# Patient Record
Sex: MALE | Race: BLACK OR AFRICAN AMERICAN | NOT HISPANIC OR LATINO | URBAN - METROPOLITAN AREA
[De-identification: names, ages, dates, MRNs, and addresses within clinical notes are randomized per-mention and may not be internally consistent; named-entity substitution may affect disease eponyms.]

---

## 2019-06-13 ENCOUNTER — EMERGENCY (EMERGENCY)
Facility: HOSPITAL | Age: 49
LOS: 1 days | Discharge: HOME | End: 2019-06-13
Attending: EMERGENCY MEDICINE | Admitting: EMERGENCY MEDICINE
Payer: COMMERCIAL

## 2019-06-13 VITALS
OXYGEN SATURATION: 100 % | DIASTOLIC BLOOD PRESSURE: 91 MMHG | SYSTOLIC BLOOD PRESSURE: 147 MMHG | TEMPERATURE: 99 F | HEART RATE: 75 BPM | RESPIRATION RATE: 16 BRPM

## 2019-06-13 LAB
ALBUMIN SERPL ELPH-MCNC: 4.4 G/DL — SIGNIFICANT CHANGE UP (ref 3.3–5)
ALP SERPL-CCNC: 57 U/L — SIGNIFICANT CHANGE UP (ref 40–120)
ALT FLD-CCNC: 29 U/L — SIGNIFICANT CHANGE UP (ref 4–41)
ANION GAP SERPL CALC-SCNC: 12 MMO/L — SIGNIFICANT CHANGE UP (ref 7–14)
AST SERPL-CCNC: 27 U/L — SIGNIFICANT CHANGE UP (ref 4–40)
BASOPHILS # BLD AUTO: 0.06 K/UL — SIGNIFICANT CHANGE UP (ref 0–0.2)
BASOPHILS NFR BLD AUTO: 0.7 % — SIGNIFICANT CHANGE UP (ref 0–2)
BILIRUB SERPL-MCNC: 1.5 MG/DL — HIGH (ref 0.2–1.2)
BUN SERPL-MCNC: 7 MG/DL — SIGNIFICANT CHANGE UP (ref 7–23)
CALCIUM SERPL-MCNC: 9.2 MG/DL — SIGNIFICANT CHANGE UP (ref 8.4–10.5)
CHLORIDE SERPL-SCNC: 101 MMOL/L — SIGNIFICANT CHANGE UP (ref 98–107)
CO2 SERPL-SCNC: 28 MMOL/L — SIGNIFICANT CHANGE UP (ref 22–31)
CREAT SERPL-MCNC: 0.87 MG/DL — SIGNIFICANT CHANGE UP (ref 0.5–1.3)
EOSINOPHIL # BLD AUTO: 0.29 K/UL — SIGNIFICANT CHANGE UP (ref 0–0.5)
EOSINOPHIL NFR BLD AUTO: 3.4 % — SIGNIFICANT CHANGE UP (ref 0–6)
GLUCOSE SERPL-MCNC: 93 MG/DL — SIGNIFICANT CHANGE UP (ref 70–99)
HCT VFR BLD CALC: 38.9 % — LOW (ref 39–50)
HGB BLD-MCNC: 13.8 G/DL — SIGNIFICANT CHANGE UP (ref 13–17)
IMM GRANULOCYTES NFR BLD AUTO: 0.2 % — SIGNIFICANT CHANGE UP (ref 0–1.5)
LIDOCAIN IGE QN: 16.4 U/L — SIGNIFICANT CHANGE UP (ref 7–60)
LYMPHOCYTES # BLD AUTO: 3.94 K/UL — HIGH (ref 1–3.3)
LYMPHOCYTES # BLD AUTO: 45.5 % — HIGH (ref 13–44)
MCHC RBC-ENTMCNC: 27.9 PG — SIGNIFICANT CHANGE UP (ref 27–34)
MCHC RBC-ENTMCNC: 35.5 % — SIGNIFICANT CHANGE UP (ref 32–36)
MCV RBC AUTO: 78.6 FL — LOW (ref 80–100)
MONOCYTES # BLD AUTO: 0.79 K/UL — SIGNIFICANT CHANGE UP (ref 0–0.9)
MONOCYTES NFR BLD AUTO: 9.1 % — SIGNIFICANT CHANGE UP (ref 2–14)
NEUTROPHILS # BLD AUTO: 3.55 K/UL — SIGNIFICANT CHANGE UP (ref 1.8–7.4)
NEUTROPHILS NFR BLD AUTO: 41.1 % — LOW (ref 43–77)
NRBC # FLD: 0.12 K/UL — SIGNIFICANT CHANGE UP (ref 0–0)
NRBC FLD-RTO: 1.4 — SIGNIFICANT CHANGE UP
PLATELET # BLD AUTO: 207 K/UL — SIGNIFICANT CHANGE UP (ref 150–400)
PMV BLD: 10 FL — SIGNIFICANT CHANGE UP (ref 7–13)
POTASSIUM SERPL-MCNC: 4.2 MMOL/L — SIGNIFICANT CHANGE UP (ref 3.5–5.3)
POTASSIUM SERPL-SCNC: 4.2 MMOL/L — SIGNIFICANT CHANGE UP (ref 3.5–5.3)
PROT SERPL-MCNC: 7.1 G/DL — SIGNIFICANT CHANGE UP (ref 6–8.3)
RBC # BLD: 4.95 M/UL — SIGNIFICANT CHANGE UP (ref 4.2–5.8)
RBC # FLD: 15.4 % — HIGH (ref 10.3–14.5)
SODIUM SERPL-SCNC: 141 MMOL/L — SIGNIFICANT CHANGE UP (ref 135–145)
WBC # BLD: 8.65 K/UL — SIGNIFICANT CHANGE UP (ref 3.8–10.5)
WBC # FLD AUTO: 8.65 K/UL — SIGNIFICANT CHANGE UP (ref 3.8–10.5)

## 2019-06-13 PROCEDURE — 70450 CT HEAD/BRAIN W/O DYE: CPT | Mod: 26

## 2019-06-13 PROCEDURE — 99284 EMERGENCY DEPT VISIT MOD MDM: CPT

## 2019-06-13 NOTE — ED PROVIDER NOTE - HISTORY ATTESTATION, MLM
October 20, 2017     [address] [place] [address]      Dear Christina Clark:    Below are the results from your recent visit:    Resulted Orders   COMP METABOLIC PANEL (14)   Result Value Ref Range    Glucose 102 (H) 70 - 99 mg/dL MD Blood Smear Consult         Evaluation of CBC with differential data and the associated peripheral blood smear demonstrates erythrocytosis, mild neutrophilia, and mild lymphocytopenia.     Neutrophils consist predominately of mature and scattered band f The white blood cell count as well as neutrophils are slightly elevated, which can indicate the presence of infection. This is most likely related to your recent gastrointestinal upset. No further testing is needed as long as your symptoms resolve.      The I have reviewed and confirmed nurses' notes...

## 2019-06-13 NOTE — ED PROVIDER NOTE - OBJECTIVE STATEMENT
50 y/o male with no PMHx presents to the ED c/o left sided headache, neck pain radiating to his head and ear. Also reports uncomfortable swallowing, mouth dryness, and abdominal pain. Denies stress, trauma, fevers/ chills, n/v/d. No other acute complaints at time of eval.

## 2019-07-29 ENCOUNTER — EMERGENCY (EMERGENCY)
Facility: HOSPITAL | Age: 49
LOS: 1 days | Discharge: HOME | End: 2019-07-29
Attending: EMERGENCY MEDICINE | Admitting: EMERGENCY MEDICINE
Payer: COMMERCIAL

## 2019-07-29 VITALS
TEMPERATURE: 99 F | RESPIRATION RATE: 16 BRPM | HEART RATE: 100 BPM | DIASTOLIC BLOOD PRESSURE: 85 MMHG | OXYGEN SATURATION: 98 % | SYSTOLIC BLOOD PRESSURE: 142 MMHG

## 2019-07-29 VITALS
RESPIRATION RATE: 18 BRPM | DIASTOLIC BLOOD PRESSURE: 86 MMHG | SYSTOLIC BLOOD PRESSURE: 143 MMHG | TEMPERATURE: 99 F | HEART RATE: 89 BPM | OXYGEN SATURATION: 98 %

## 2019-07-29 LAB
ALBUMIN SERPL ELPH-MCNC: 4.4 G/DL — SIGNIFICANT CHANGE UP (ref 3.3–5)
ALP SERPL-CCNC: 63 U/L — SIGNIFICANT CHANGE UP (ref 40–120)
ALT FLD-CCNC: 19 U/L — SIGNIFICANT CHANGE UP (ref 4–41)
ANION GAP SERPL CALC-SCNC: 11 MMO/L — SIGNIFICANT CHANGE UP (ref 7–14)
AST SERPL-CCNC: 23 U/L — SIGNIFICANT CHANGE UP (ref 4–40)
BILIRUB SERPL-MCNC: 2.3 MG/DL — HIGH (ref 0.2–1.2)
BUN SERPL-MCNC: 11 MG/DL — SIGNIFICANT CHANGE UP (ref 7–23)
CALCIUM SERPL-MCNC: 9.6 MG/DL — SIGNIFICANT CHANGE UP (ref 8.4–10.5)
CHLORIDE SERPL-SCNC: 101 MMOL/L — SIGNIFICANT CHANGE UP (ref 98–107)
CO2 SERPL-SCNC: 29 MMOL/L — SIGNIFICANT CHANGE UP (ref 22–31)
CREAT SERPL-MCNC: 0.86 MG/DL — SIGNIFICANT CHANGE UP (ref 0.5–1.3)
GLUCOSE SERPL-MCNC: 100 MG/DL — HIGH (ref 70–99)
HCT VFR BLD CALC: 41.1 % — SIGNIFICANT CHANGE UP (ref 39–50)
HGB BLD-MCNC: 14.4 G/DL — SIGNIFICANT CHANGE UP (ref 13–17)
MCHC RBC-ENTMCNC: 27.9 PG — SIGNIFICANT CHANGE UP (ref 27–34)
MCHC RBC-ENTMCNC: 35 % — SIGNIFICANT CHANGE UP (ref 32–36)
MCV RBC AUTO: 79.7 FL — LOW (ref 80–100)
NRBC # FLD: 0.06 K/UL — SIGNIFICANT CHANGE UP (ref 0–0)
PLATELET # BLD AUTO: 204 K/UL — SIGNIFICANT CHANGE UP (ref 150–400)
PMV BLD: 10 FL — SIGNIFICANT CHANGE UP (ref 7–13)
POTASSIUM SERPL-MCNC: 4.3 MMOL/L — SIGNIFICANT CHANGE UP (ref 3.5–5.3)
POTASSIUM SERPL-SCNC: 4.3 MMOL/L — SIGNIFICANT CHANGE UP (ref 3.5–5.3)
PROT SERPL-MCNC: 8 G/DL — SIGNIFICANT CHANGE UP (ref 6–8.3)
RBC # BLD: 5.16 M/UL — SIGNIFICANT CHANGE UP (ref 4.2–5.8)
RBC # FLD: 15.9 % — HIGH (ref 10.3–14.5)
SODIUM SERPL-SCNC: 141 MMOL/L — SIGNIFICANT CHANGE UP (ref 135–145)
TROPONIN T, HIGH SENSITIVITY: < 6 NG/L — SIGNIFICANT CHANGE UP (ref ?–14)
WBC # BLD: 14.63 K/UL — HIGH (ref 3.8–10.5)
WBC # FLD AUTO: 14.63 K/UL — HIGH (ref 3.8–10.5)

## 2019-07-29 PROCEDURE — 99284 EMERGENCY DEPT VISIT MOD MDM: CPT

## 2019-07-29 PROCEDURE — 71046 X-RAY EXAM CHEST 2 VIEWS: CPT | Mod: 26

## 2019-07-29 RX ORDER — CIPROFLOXACIN LACTATE 400MG/40ML
1 VIAL (ML) INTRAVENOUS
Qty: 4 | Refills: 0
Start: 2019-07-29 | End: 2019-08-01

## 2019-07-29 NOTE — ED PROVIDER NOTE - NS ED ROS FT
· CONSTITUTIONAL: + subjective fevers, no chills  · CARDIOVASCULAR: no chest pain and no edema.  · RESPIRATORY: + dry cough + inspiratory pain  · GASTROINTESTINAL:  no constipation, no diarrhea, no nausea and no vomiting.  · GENITOURINARY: no dysuria, no frequency, and no hematuria.  · MUSCULOSKELETAL: no musculoskeletal pain, no neck pain, and no weakness.  · NEURO: no loss of consciousness, no gait abnormality,  no sensory deficits, and no weakness.

## 2019-07-29 NOTE — ED ADULT TRIAGE NOTE - CHIEF COMPLAINT QUOTE
pt c/o right sided "lung"/ chest/ upper R sided back pain, on inspiration, since yesterday. also endorsing some shortness of breath. PMH- HTN. pt recently drove to [place] last week, no recent plane rides.

## 2019-07-29 NOTE — ED PROVIDER NOTE - PHYSICAL EXAMINATION
PHYSICAL EXAM:  GENERAL: NAD, Resting in bed  HEAD:  Atraumatic, Normocephalic  EYES: EOMI, PERRLA, conjunctiva and sclera clear  CHEST/LUNG: Clear to auscultation bilaterally; poor inspiratory effort, No rales, rhonchi, wheezing, or rubs.   HEART: Regular rate and rhythm; No murmurs, rubs, or gallops  ABDOMEN: Bowel sounds present; Soft, Nontender, Nondistended.  EXTREMITIES:  2+ Peripheral Pulses, No edema  NERVOUS SYSTEM:  Alert & Oriented X3  MSK: FROM all 4 extremities, full and equal strength, no pain with passive or active motion of right shoulder  SKIN: No rashes or lesions PHYSICAL EXAM:  GENERAL: NAD, Resting in bed  HEAD:  Atraumatic, Normocephalic  EYES: EOMI, PERRLA, conjunctiva and sclera clear  CHEST/LUNG: crackles in the right base; poor inspiratory effort  HEART: Regular rate and rhythm; No murmurs, rubs, or gallops  ABDOMEN: Bowel sounds present; Soft, Nontender, Nondistended.  EXTREMITIES:  2+ Peripheral Pulses, No edema  NERVOUS SYSTEM:  Alert & Oriented X3  MSK: FROM all 4 extremities, full and equal strength, no pain with passive or active motion of right shoulder  SKIN: No rashes or lesions

## 2019-07-29 NOTE — ED PROVIDER NOTE - NSFOLLOWUPINSTRUCTIONS_ED_ALL_ED_FT
DISCHARGE:    Pt was discharged home/self-care.    Pt was discharged with the following prescriptions: Levofloxacin 750 mg 4 days.   Take all medications as directed.     Pt was provided written discharge instructions.    Pt was asked to return to the ED immediately for any new or concerning symptoms or if they worsen.    Pt instructed to follow-up with PCP within 3-5 days.  (If you don't have a PCP, call [facility] at [phone] to find a primary care physician. Or call [phone] to make an appointment with the clinic.)    If you had labs and/or imaging done, copies were given to you, the patient. Please bring these copies to your follow up appointments.   Pt was in agreement, endorsed understanding, and questions were answered.

## 2019-07-29 NOTE — ED PROVIDER NOTE - OBJECTIVE STATEMENT
48 yo M w/ PMH HTN presenting with a 1 day history of right-sided shoulder pain that is worse with deep inspiration and that radiates to his stomach associated with subjective fevers, dry cough, and headache. Pt states he took 2 Advil and had partial relief of symptoms. Recent travel via car to [place] [facility]  Says he had "walking pneumonia" last year and his symptoms feel similar to that.

## 2019-07-29 NOTE — ED ADULT NURSE NOTE - OBJECTIVE STATEMENT
received pt to room 16 aox4 in no apparent distress VSS NSR on cardiac monitor ambulatory. Pt c/o R chest pain with mild abdominal pain and R shoulder pain. Pt denies any trauma or heavy lifting. Pt states pain worsens on inspiration but denies SOB or difficulty breathing. Denies palpitations, weakness, recent sickness, fevers, n/v, numbness tingling. Breaths equal and unlabored 20 G IV R AC placed labs sent will continue to monitor

## 2019-07-29 NOTE — ED PROVIDER NOTE - PROGRESS NOTE DETAILS
Adult male c/o "lung pain" similar to previous episode of pneumonia. Patient has pneumonia on CXR. Stable vital signs, afebrile. Will give 1st dose of levofloxacin now and send prescription to pharmacy to take 4 more days of antibiotic. Patient instructed to follow up with his primary care doctor.

## 2019-07-29 NOTE — ED PROVIDER NOTE - CLINICAL SUMMARY MEDICAL DECISION MAKING FREE TEXT BOX
48 yo M presenting with right shoulder pain which is sharp and radiates down to his stomach, associated with subjective fevers, pain with deep inspiration  EKG wnl  basic labs, CXR, troponin

## 2019-07-29 NOTE — ED PROVIDER NOTE - ATTENDING CONTRIBUTION TO CARE
agree with resident note  "50 yo M w/ PMH HTN presenting with a 1 day history of right-sided shoulder pain that is worse with deep inspiration and that radiates to his stomach associated with subjective fevers, dry cough, and headache. Pt states he took 2 Advil and had partial relief of symptoms. Recent travel via car to [place]  Says he had "walking pneumonia" last year and his symptoms feel similar to that."    PE: well appearing; decreased breath sounds at right base with crackles; s1 s2 no m/r/g abd soft/NT/ND    Imp: CXR shows RLL PNA: pt well appearing not hypoxic; will give 1st dose abx here and d/c on abx; understands return precautions

## 2020-07-13 ENCOUNTER — EMERGENCY (EMERGENCY)
Facility: HOSPITAL | Age: 50
LOS: 1 days | Discharge: HOME | End: 2020-07-13
Admitting: EMERGENCY MEDICINE
Payer: COMMERCIAL

## 2020-07-13 VITALS
RESPIRATION RATE: 18 BRPM | TEMPERATURE: 99 F | OXYGEN SATURATION: 98 % | SYSTOLIC BLOOD PRESSURE: 139 MMHG | DIASTOLIC BLOOD PRESSURE: 95 MMHG | HEART RATE: 83 BPM

## 2020-07-13 PROCEDURE — 73110 X-RAY EXAM OF WRIST: CPT | Mod: 26,RT

## 2020-07-13 PROCEDURE — 73130 X-RAY EXAM OF HAND: CPT | Mod: 26,RT

## 2020-07-13 PROCEDURE — 99283 EMERGENCY DEPT VISIT LOW MDM: CPT

## 2020-07-13 NOTE — ED PROVIDER NOTE - NSFOLLOWUPINSTRUCTIONS_ED_ALL_ED_FT
REST, NO STRENUOUS ACTIVITY  CONTINUE TAKING ANY CURRENT MEDICATIONS  TAKE ANY NEW MEDICATIONS AS DIRECTED  **FOLLOW UP WITH ORTHOPEDICS AS DIRECTED**  RETURN TO ER FOR WORSENING SYMPTOMS

## 2020-07-13 NOTE — ED PROVIDER NOTE - OBJECTIVE STATEMENT
49 y/o M with no PMHx presents to the ED c/o right thumb and wrist pain x1 day. Pt states earlier this afternoon attempted to life air conditioner while lifting he felt pain to right thumb. Put the air conditioner down and noted mild swelling to base of thumb and wrist. Now c/o 6 out 10 pain. Decreased ROM of thumb due to pain and increased swelling. Denies numbness, tingling, blunt trauma to area. No previous injury to thumb. Nonsmoker. Nondrinker. NKDA.

## 2020-07-13 NOTE — ED ADULT TRIAGE NOTE - CHIEF COMPLAINT QUOTE
pt states he injured his right hand, wrist and thumb lifting air conditioner 30 min ago. pt states unable to move right thumb d/t pain.

## 2020-07-13 NOTE — ED PROVIDER NOTE - UPPER EXTREMITY EXAM, RIGHT
LIMITED ROM/SWELLING/mild swelling noted to base of thumb on dorsal aspect, decreased ROM secondary to pain, no deformity palpated, sensation intact, radial plus normal, good cap refill, able to move all finger but with pain

## 2020-07-13 NOTE — ED ADULT NURSE REASSESSMENT NOTE - NS ED NURSE REASSESS COMMENT FT1
pt resting in results waiting. pt alert and oriented x4. Denies pain or discomforts at this time. Appears comfortable. Pt waiting for xray results

## 2020-07-13 NOTE — ED PROVIDER NOTE - PATIENT PORTAL LINK FT
You can access the FollowMyHealth Patient Portal offered by [facility] by registering at the following website: [email or web address]. By joining [email or web address]’s FollowMyHealth portal, you will also be able to view your health information using other applications (apps) compatible with our system.

## 2020-07-20 ENCOUNTER — APPOINTMENT (OUTPATIENT)
Dept: ORTHOPEDIC SURGERY | Facility: CLINIC | Age: 50
End: 2020-07-20
Payer: COMMERCIAL

## 2020-07-20 VITALS
SYSTOLIC BLOOD PRESSURE: 162 MMHG | DIASTOLIC BLOOD PRESSURE: 93 MMHG | HEIGHT: 73 IN | WEIGHT: 250 LBS | HEART RATE: 76 BPM | BODY MASS INDEX: 33.13 KG/M2

## 2020-07-20 VITALS — TEMPERATURE: 97.2 F

## 2020-07-20 DIAGNOSIS — S66.901D UNSPECIFIED INJURY OF UNSPECIFIED MUSCLE, FASCIA AND TENDON AT WRIST AND HAND LEVEL, RIGHT HAND, SUBSEQUENT ENCOUNTER: ICD-10-CM

## 2020-07-20 PROCEDURE — 99204 OFFICE O/P NEW MOD 45 MIN: CPT

## 2020-07-20 PROCEDURE — 73110 X-RAY EXAM OF WRIST: CPT | Mod: RT

## 2020-07-20 PROCEDURE — 73090 X-RAY EXAM OF FOREARM: CPT | Mod: RT

## 2022-10-20 ENCOUNTER — EMERGENCY (EMERGENCY)
Facility: HOSPITAL | Age: 52
LOS: 1 days | Discharge: HOME | End: 2022-10-20
Attending: EMERGENCY MEDICINE | Admitting: EMERGENCY MEDICINE

## 2022-10-20 VITALS
HEIGHT: 73 IN | SYSTOLIC BLOOD PRESSURE: 142 MMHG | OXYGEN SATURATION: 100 % | RESPIRATION RATE: 16 BRPM | HEART RATE: 96 BPM | DIASTOLIC BLOOD PRESSURE: 79 MMHG | TEMPERATURE: 98 F

## 2022-10-20 LAB
APPEARANCE UR: ABNORMAL
BACTERIA # UR AUTO: ABNORMAL
BILIRUB UR-MCNC: NEGATIVE — SIGNIFICANT CHANGE UP
COLOR SPEC: YELLOW — SIGNIFICANT CHANGE UP
DIFF PNL FLD: ABNORMAL
EPI CELLS # UR: 1 /HPF — SIGNIFICANT CHANGE UP (ref 0–5)
GLUCOSE UR QL: NEGATIVE — SIGNIFICANT CHANGE UP
HYALINE CASTS # UR AUTO: 2 /LPF — SIGNIFICANT CHANGE UP (ref 0–7)
KETONES UR-MCNC: NEGATIVE — SIGNIFICANT CHANGE UP
LEUKOCYTE ESTERASE UR-ACNC: ABNORMAL
NITRITE UR-MCNC: NEGATIVE — SIGNIFICANT CHANGE UP
PH UR: 6 — SIGNIFICANT CHANGE UP (ref 5–8)
PROT UR-MCNC: ABNORMAL
RBC CASTS # UR COMP ASSIST: 18 /HPF — HIGH (ref 0–4)
SP GR SPEC: 1.02 — SIGNIFICANT CHANGE UP (ref 1.01–1.05)
UROBILINOGEN FLD QL: SIGNIFICANT CHANGE UP
WBC UR QL: 260 /HPF — HIGH (ref 0–5)

## 2022-10-20 PROCEDURE — 99284 EMERGENCY DEPT VISIT MOD MDM: CPT

## 2022-10-20 RX ORDER — TAMSULOSIN HYDROCHLORIDE 0.4 MG/1
1 CAPSULE ORAL
Qty: 14 | Refills: 0
Start: 2022-10-20 | End: 2022-11-02

## 2022-10-20 RX ORDER — CIPROFLOXACIN LACTATE 400MG/40ML
1 VIAL (ML) INTRAVENOUS
Qty: 14 | Refills: 0
Start: 2022-10-20 | End: 2022-10-26

## 2022-10-20 RX ORDER — CIPROFLOXACIN LACTATE 400MG/40ML
500 VIAL (ML) INTRAVENOUS ONCE
Refills: 0 | Status: COMPLETED | OUTPATIENT
Start: 2022-10-20 | End: 2022-10-20

## 2022-10-20 RX ADMIN — Medication 500 MILLIGRAM(S): at 14:13

## 2022-10-20 NOTE — ED ADULT TRIAGE NOTE - CHIEF COMPLAINT QUOTE
Review Findings: no new or changing lesions
Number Of Photographs Reviewed: 1
Detail Level: Detailed
pt c/o urinary frequency and chills since last night.

## 2022-10-20 NOTE — ED ADULT TRIAGE NOTE - AS HEIGHT TYPE
Sudden numbness or weakness of the face, arm, or leg, especially on one side of the body. Confusion, trouble speaking or understanding. Trouble seeing in one or both eyes. Trouble walking, dizziness, loss of balance or coordination. Severe headache. stated

## 2022-10-20 NOTE — ED PROVIDER NOTE - OBJECTIVE STATEMENT
yes 52M no PMH p/w dysuria and urinary frequency. Yesterday pt started having urinary frequency and today the dysuria started. Endorses subjective fever and chills. Denies any penile discharge, N/V, abdominal pain, hematuria, chest pain, SOB, diarrhea, constipation. No known hx of BPH. Is sexually active with one female partner, does not use protection. No hx of STIs.

## 2022-10-20 NOTE — ED PROVIDER NOTE - NSFOLLOWUPINSTRUCTIONS_ED_ALL_ED_FT
You have been evaluated in the Emergency Department today for increased urination and pain with urination. You were found to have a UTI.    Please schedule an appointment with your primary care physician with 2-3 days for follow-up.    Please take the prescribed Ciprofloxacin antibiotic 2 times a day. Please also take the Flomax once a day at night.    Our Urology group will call you to schedule an appointment for your enlarged prostate.    Return to the Emergency Department if you experience worsening or uncontrolled pain, bloody urine, fevers 100.4°F or greater, recurrent vomiting, or any other concerning symptoms.    Thank you for choosing us for your care.

## 2022-10-20 NOTE — ED PROVIDER NOTE - ATTENDING CONTRIBUTION TO CARE
Otherwise healthy male presents emergency department with 2 days of urinary frequency and urgency and hesitancy.  Denies any penile discharge, penile discomfort or scrotal discomfort.  Is reporting chills over the last 2 days.  Denies rectal pain, back pain, nausea, vomiting.  Sexually active with 1 female partner.  No history of sexually transmitted infections.  He has normal  exam without any scrotal tenderness or erythema, no testicular masses or tenderness, no discharge from urethra, no lesions to penis or scrotal area.  On rectal exam he does have an enlarged prostate that is firm, no bogginess or significant tenderness to palpation.  Will send UA and culture, gonorrhea chlamydia, will also give Rx for Flomax given suspicion of BPH on exam and urology follow-up.

## 2022-10-20 NOTE — ED PROVIDER NOTE - PHYSICAL EXAMINATION
PHYSICAL EXAM:  GENERAL: Sitting comfortable in bed, in no acute distress  HENMT: Atraumatic, moist mucous membranes, no oropharyngeal exudates or vesicles, uvula is midline EYES: Clear bilaterally, PERRL, EOMs intact b/l  HEART: RRR, S1/S2, no murmur  RESPIRATORY: Clear to auscultation bilaterally, no wheezes/rhonchi/rales  ABDOMEN: +BS, soft, nontender, nondistended, no CVAT  TESTICULAR: exam with Dr. Soliz - no penile discharge, no testicular masses  RECTAL: exam with Dr. Soliz - no external or internal hemorrhoids, enlarged non boggy prostate  EXTREMITIES: No lower extremity edema, +2 radial pulses b/l  NEURO: A&Ox4  SKIN: Skin normal color for race, warm, dry and intact

## 2022-10-20 NOTE — ED PROVIDER NOTE - CLINICAL SUMMARY MEDICAL DECISION MAKING FREE TEXT BOX
52M no PMH p/w dysuria and urinary frequency. Vitals /79, other vitals unremarkable. On exam, abdomen +BS, soft, nontender, nondistended, no CVAT, no penile discharge, no testicular masses, enlarged non boggy prostate. DDx: UTI, prostatitis, STI. Plan: UA/UC, gonorrhea and chlamydia. Will re-assess.

## 2022-10-20 NOTE — ED PROVIDER NOTE - PROGRESS NOTE DETAILS
Elizabeth Rodriguez M.D. (Resident Physician): Pt has UTI. Started on cipro. Prescribed cipro and flomax. Will give uro f/u. Ready for d/c home.

## 2022-10-20 NOTE — ED ADULT NURSE NOTE - OBJECTIVE STATEMENT
Patient presents to the ED for c/o urinary frequency and chills since last night. He is afebrile, denies pain. Patient is AA&Ox4, in no acute distress, able to provide urine samples as needed.

## 2022-10-21 LAB
C TRACH RRNA SPEC QL NAA+PROBE: SIGNIFICANT CHANGE UP
N GONORRHOEA RRNA SPEC QL NAA+PROBE: SIGNIFICANT CHANGE UP
SPECIMEN SOURCE: SIGNIFICANT CHANGE UP

## 2023-02-06 NOTE — ED PROVIDER NOTE - DISPOSITION TYPE
Discharged in stable condition, alert and oriented. Prescribed medication given and instructed properly. Instructed to established a PCP. Pt verbalized understanding of the information given. Informed pt that we cannot give the sick note yesterday as he visited today only, asked CN Dexter also its not possible to give from yesterday.    DISCHARGE

## 2025-05-21 NOTE — ED PROVIDER NOTE - CARE PLAN
Before Surgery/Procedure Hold (Do Not Take) these Medications        - 7 days before high bleeding risk surgery hold (aspirin 81 MG chewable tablet [[id number]])     Principal Discharge DX:	Wrist sprain